# Patient Record
Sex: MALE | Race: BLACK OR AFRICAN AMERICAN | Employment: FULL TIME | URBAN - METROPOLITAN AREA
[De-identification: names, ages, dates, MRNs, and addresses within clinical notes are randomized per-mention and may not be internally consistent; named-entity substitution may affect disease eponyms.]

---

## 2017-10-26 ENCOUNTER — HOSPITAL ENCOUNTER (EMERGENCY)
Age: 42
Discharge: HOME | End: 2017-10-26
Attending: EMERGENCY MEDICINE
Payer: COMMERCIAL

## 2017-10-26 VITALS
HEART RATE: 90 BPM | WEIGHT: 150 LBS | BODY MASS INDEX: 24.11 KG/M2 | HEIGHT: 66 IN | OXYGEN SATURATION: 100 % | SYSTOLIC BLOOD PRESSURE: 132 MMHG | DIASTOLIC BLOOD PRESSURE: 90 MMHG | TEMPERATURE: 98.1 F | RESPIRATION RATE: 16 BRPM

## 2017-10-26 DIAGNOSIS — Z11.3 SCREENING FOR STD (SEXUALLY TRANSMITTED DISEASE): Primary | ICD-10-CM

## 2017-10-26 LAB
APPEARANCE UR: CLEAR
BACTERIA URNS QL MICRO: NEGATIVE /HPF
BILIRUB UR QL: NEGATIVE
COLOR UR: YELLOW
EPITH CASTS URNS QL MICRO: NEGATIVE /LPF (ref 0–5)
GLUCOSE UR STRIP.AUTO-MCNC: NEGATIVE MG/DL
HGB UR QL STRIP: NEGATIVE
KETONES UR QL STRIP.AUTO: NEGATIVE MG/DL
LEUKOCYTE ESTERASE UR QL STRIP.AUTO: ABNORMAL
NITRITE UR QL STRIP.AUTO: NEGATIVE
PH UR STRIP: 5 [PH] (ref 5–8)
PROT UR STRIP-MCNC: NEGATIVE MG/DL
RBC #/AREA URNS HPF: NORMAL /HPF (ref 0–5)
SP GR UR REFRACTOMETRY: 1.02 (ref 1–1.03)
UROBILINOGEN UR QL STRIP.AUTO: 0.2 EU/DL (ref 0.2–1)
WBC URNS QL MICRO: NORMAL /HPF (ref 0–5)

## 2017-10-26 PROCEDURE — 74011000250 HC RX REV CODE- 250: Performed by: PHYSICIAN ASSISTANT

## 2017-10-26 PROCEDURE — 74011250637 HC RX REV CODE- 250/637: Performed by: PHYSICIAN ASSISTANT

## 2017-10-26 PROCEDURE — 99284 EMERGENCY DEPT VISIT MOD MDM: CPT

## 2017-10-26 PROCEDURE — 87491 CHLMYD TRACH DNA AMP PROBE: CPT

## 2017-10-26 PROCEDURE — 74011250636 HC RX REV CODE- 250/636: Performed by: PHYSICIAN ASSISTANT

## 2017-10-26 PROCEDURE — 96372 THER/PROPH/DIAG INJ SC/IM: CPT

## 2017-10-26 PROCEDURE — 81001 URINALYSIS AUTO W/SCOPE: CPT

## 2017-10-26 RX ORDER — AZITHROMYCIN 250 MG/1
1000 TABLET, FILM COATED ORAL
Status: COMPLETED | OUTPATIENT
Start: 2017-10-26 | End: 2017-10-26

## 2017-10-26 RX ADMIN — LIDOCAINE HYDROCHLORIDE 500 MG: 10 INJECTION, SOLUTION INFILTRATION; PERINEURAL at 12:46

## 2017-10-26 RX ADMIN — AZITHROMYCIN 1000 MG: 250 TABLET, FILM COATED ORAL at 12:46

## 2017-10-26 NOTE — ED NOTES
Pt reports he had unprotected sex a few days ago - now worried that he may have contracted an STD. Denies any symptoms.

## 2017-10-26 NOTE — ED PROVIDER NOTES
[address]  EMERGENCY DEPARTMENT HISTORY AND PHYSICAL EXAM       Date: 10/26/2017   Patient Name: Anat Louise   YOB: 1975  Medical Record Number: [id number]    History of Presenting Illness     Chief Complaint   Patient presents with    Other      History Provided By:  patient    Additional History: 12:20 PM  Anat Louise is a 40 y.o. male who presents to the emergency department requesting STD check after having sexual intercourse 2 days ago while intoxicated. No known STD history from sexual partner, though suspected. No associated symptoms. Pt denies penile discharge, penile pain, difficulty urinating, dysuria, abdominal pain, and any other Sx or complaints. Pt states he would like to be treated for \"the usual suspects\" regardless of lack of symptoms at this time. Primary Care Provider: None   Specialist:    Past History     Past Medical History:   Past Medical History:   Diagnosis Date    Acid reflux     Anxiety         Past Surgical History:   No past surgical history on file. Family History:   No family history on file. Social History:   Social History   Substance Use Topics    Smoking status: Not on file    Smokeless tobacco: Not on file    Alcohol use Not on file        Allergies: Allergies   Allergen Reactions    Penicillins Unknown (comments)        Review of Systems   Review of Systems   Gastrointestinal: Negative for abdominal pain. Genitourinary: Negative for difficulty urinating, discharge, dysuria and penile pain. All other systems reviewed and are negative. Physical Exam  Vitals:    10/26/17 1209 10/26/17 1314   BP: (!) 132/97 132/90   Pulse: 98 90   Resp: 16 16   Temp: 98.1 °F (36.7 °C)    SpO2: 100% 100%   Weight: 68 kg (150 lb)    Height: 5' 6\" (1.676 m)        Physical Exam   Constitutional: He is oriented to person, place, and time. Vital signs are normal. He appears well-developed and well-nourished.  No distress. HENT:   Head: Normocephalic and atraumatic. Eyes: Conjunctivae are normal.   Neck: Normal range of motion. Neck supple. Cardiovascular: Normal rate, regular rhythm and normal heart sounds. Pulmonary/Chest: Effort normal and breath sounds normal. No respiratory distress. He has no wheezes. He has no rales. He exhibits no tenderness. Abdominal: Soft. Bowel sounds are normal. He exhibits no distension and no mass. There is no tenderness. There is no rebound and no guarding. Genitourinary:   Genitourinary Comments: Pt declined genital exam   Musculoskeletal: Normal range of motion. Neurological: He is alert and oriented to person, place, and time. Skin: Skin is warm and dry. No rash noted. He is not diaphoretic. No erythema. Psychiatric: He has a normal mood and affect. His behavior is normal.   Nursing note and vitals reviewed. Diagnostic Study Results     Labs -      No results found for this or any previous visit (from the past 12 hour(s)). Radiologic Studies -  The following have been ordered and reviewed:  No orders to display           Medical Decision Making   I am the first provider for this patient. I reviewed the vital signs, available nursing notes, past medical history, past surgical history, family history and social history. Vital Signs-Reviewed the patient's vital signs. No data found. Pulse Oximetry Analysis - Normal 100% on RA     Old Medical Records: Nursing notes. Procedures:   Procedures    ED Course:  12:20 PM  Initial assessment performed. The patients presenting problems have been discussed, and they are in agreement with the care plan formulated and outlined with them. I have encouraged them to ask questions as they arise throughout their visit.     Medications Given in the ED:  Medications   cefTRIAXone (ROCEPHIN) 500 mg in lidocaine (XYLOCAINE) 10 mg/mL (1 %) IM syringe (500 mg IntraMUSCular Given 10/26/17 3076)   azithromycin [facility]) tablet 1,000 mg (1,000 mg Oral Given 10/26/17 1246)       Discharge Note:  12:53 PM  Pt has been reexamined. Patient has no new complaints, changes, or physical findings. Care plan outlined and precautions discussed. Results were reviewed with the patient. All of pt's questions and concerns were addressed. Patient was instructed and agrees to follow up with PCP, as well as to return to the ED upon further deterioration. Patient is ready to go home. Diagnosis   Clinical Impression:   1. Screening for STD (sexually transmitted disease)           Follow-up Information     Follow up With Details Comments Leeanne Turner MD Schedule an appointment as soon as possible for a visit For primary care follow up [id number] [address]  [phone]      [facility] EMERGENCY DEPT Go to As needed, as symptoms worsen [address] [address]  [phone]          Discharge Medication List as of 10/26/2017 12:53 PM          _______________________________   Attestations: This note is prepared by Get Ramesh, acting as a Scribe for [employer], PA-C. on 12:13 PM on 10/26/2017 . [employer], PA-C.: The scribe's documentation has been prepared under my direction and personally reviewed by me in its entirety.   _______________________________

## 2017-10-26 NOTE — ED TRIAGE NOTES
C/o drinking alcohol the other night and having sex with someone else. States he wants to be treated for STD's, that he made a bad decision. Denies sx's.

## 2017-10-26 NOTE — DISCHARGE INSTRUCTIONS
Exposure to Sexually Transmitted Infections: Care Instructions  Your Care Instructions    Sexually transmitted infections (STIs) are those diseases spread by sexual contact. There are at least 20 different STIs, including chlamydia, gonorrhea, syphilis, and human immunodeficiency virus (HIV), which causes AIDS. Bacteria-caused STIs can be treated and cured. STIs caused by viruses, such as HIV, can be treated but not cured. Some STIs can reduce a woman's chances of getting pregnant in the future. STIs are spread during sexual contact, such as vaginal intercourse and oral or anal sex. Follow-up care is a key part of your treatment and safety. Be sure to make and go to all appointments, and call your doctor if you are having problems. It's also a good idea to know your test results and keep a list of the medicines you take. How can you care for yourself at home? · Your doctor may have given you a shot of antibiotics. If your doctor prescribed antibiotic pills, take them as directed. Do not stop taking them just because you feel better. You need to take the full course of antibiotics. · Do not have sexual contact while you have symptoms of an STI or are being treated for an STI. · Tell your sex partner (or partners) that he or she will need treatment. · If you are a woman, do not douche. Douching changes the normal balance of bacteria in the vagina and may spread an infection up into your reproductive organs. To prevent exposure to STIs in the future  · Use latex condoms every time you have sex. Use them from the beginning to the end of sexual contact. · Talk to your partner before you have sex. Find out if he or she has or is at risk for any STI. Keep in mind that a person may be able to spread an STI even if he or she does not have symptoms. · Do not have sex if you are being treated for an STI. · Do not have sex with anyone who has symptoms of an STI, such as sores on the genitals or mouth.   · Having one sex partner (who does not have STIs and does not have sex with anyone else) is a good way to avoid STIs. When should you call for help? Call your doctor now or seek immediate medical care if:  ? · You have new pain in your belly or pelvis. ? · You have symptoms of a urinary tract infection. These may include:  ¨ Pain or burning when you urinate. ¨ A frequent need to urinate without being able to pass much urine. ¨ Pain in the flank, which is just below the rib cage and above the waist on either side of the back. ¨ Blood in your urine. ¨ A fever. ? · You have new or worsening pain or swelling in the scrotum. ? Watch closely for changes in your health, and be sure to contact your doctor if:  ? · You have unusual vaginal bleeding. ? · You have a discharge from the vagina or penis. ? · You have any new symptoms, such as sores, bumps, rashes, blisters, or warts. ? · You have itching, tingling, pain, or burning in the genital or anal area. ? · You think you may have an STI. Where can you learn more? Go to http://gemma-natalya.info/. Enter R210 in the search box to learn more about \"Exposure to Sexually Transmitted Infections: Care Instructions. \"  Current as of: March 20, 2017  Content Version: 11.4  © 6157-7971 [employer]. Care instructions adapted under license by [employer] (which disclaims liability or warranty for this information). If you have questions about a medical condition or this instruction, always ask your healthcare professional. [place] [address] any warranty or liability for your use of this information. Learning How to Use a Male Condom  What is a male condom? Condoms can be used to prevent pregnancy. They can also help protect against sexually transmitted infections (STIs). You must use a new condom every time you have sex. Condoms prevent pregnancy by keeping sperm and eggs apart.  The condom holds the sperm so the sperm can't get into the vagina. A male condom is a tube of soft rubber or plastic with a closed end. It fits over the penis. There are many kinds of male condoms. Some condoms are lubricated. Some are ribbed. Most have a \"reservoir tip\" for holding the semen. You can also buy condoms of different sizes. How do you use a condom? Condoms work best if you follow these steps. · Use a new condom each time you have sex. · Check the condom's expiration date. Do not use it past that date. · When opening the condom wrapper, be sure not to poke a hole in the condom with your fingernails, teeth, or other sharp objects. · Put the condom on as soon as the penis is hard (erect) and before any sexual contact with your partner. ¨ First, hold the tip of the condom and squeeze out the air. This leaves room for the semen after you ejaculate. ¨ If you are not circumcised, pull down the loose skin from the head of the penis (foreskin) before you put on the condom. ¨ Hold on to the tip of the condom as you unroll the condom. Unroll it all the way down to the base of the penis. · After you ejaculate, hold on to the condom at the base of the penis, and withdraw from your partner while your penis is still erect. This will keep semen from spilling out of the condom. · Wash your hands after you handle a used condom. How do you buy and store condoms? · Male condoms may be available for free at family planning clinics. You can buy them without a prescription at drugstores, online, and in some grocery stores. · Keep condoms wrapped in their original packages until you are ready to use them. Store them in a cool, dry place out of direct sunlight. · Don't keep rubber (latex) condoms in a glove compartment or other hot places for a long time. Heat weakens latex and increases the chance that the condom will break. · Don't use condoms in damaged packages.  And don't use condoms that are brittle, sticky, or discolored, even if they are not past their expiration date. What else do you need to know? · To protect yourself and your partner from STIs, use a condom during vaginal, oral, or anal sex. · If the condom breaks or you think sperm may have leaked out into the vagina, the woman can use emergency contraception, such as the morning-after pill (Plan B). Emergency contraception can be used for up to 5 days after you have had sex. But it works best if you use it right away. · Use a male condom with another form of birth control. It's the best way to prevent pregnancy. ¨ You can use the condom with hormonal contraception, an intrauterine device (IUD), a diaphragm, a sponge, a shield, or a cervical cap. ¨ Don't use a male condom with a female condom. ¨ Use spermicide as its instructions say. Don't put spermicide inside a condom. · If you or your partner gets a rash or feels itchy after using a latex condom, talk to your doctor. You may have a latex allergy. Where can you learn more? Go to http://gemma-natalya.info/. Enter Y557 in the search box to learn more about \"Learning How to Use a Male Condom. \"  Current as of: March 16, 2017  Content Version: 11.4  © 3341-9835 [employer]. Care instructions adapted under license by [employer] (which disclaims liability or warranty for this information). If you have questions about a medical condition or this instruction, always ask your healthcare professional. [place] [address] any warranty or liability for your use of this information.

## 2017-10-27 LAB
C TRACH RRNA SPEC QL NAA+PROBE: NEGATIVE
N GONORRHOEA RRNA SPEC QL NAA+PROBE: NEGATIVE
SPECIMEN SOURCE: NORMAL

## 2019-07-19 ENCOUNTER — HOSPITAL ENCOUNTER (EMERGENCY)
Age: 44
Discharge: HOME | End: 2019-07-19
Attending: EMERGENCY MEDICINE
Payer: COMMERCIAL

## 2019-07-19 VITALS
WEIGHT: 145 LBS | SYSTOLIC BLOOD PRESSURE: 137 MMHG | BODY MASS INDEX: 23.3 KG/M2 | HEART RATE: 83 BPM | HEIGHT: 66 IN | OXYGEN SATURATION: 100 % | RESPIRATION RATE: 16 BRPM | TEMPERATURE: 96.7 F | DIASTOLIC BLOOD PRESSURE: 90 MMHG

## 2019-07-19 DIAGNOSIS — R25.2 MUSCLE CRAMPS: Primary | ICD-10-CM

## 2019-07-19 LAB
ANION GAP SERPL CALC-SCNC: 5 MMOL/L (ref 3–18)
BUN SERPL-MCNC: 18 MG/DL (ref 7–18)
BUN/CREAT SERPL: 15 (ref 12–20)
CALCIUM SERPL-MCNC: 8.5 MG/DL (ref 8.5–10.1)
CHLORIDE SERPL-SCNC: 101 MMOL/L (ref 100–111)
CK SERPL-CCNC: 301 U/L (ref 39–308)
CO2 SERPL-SCNC: 29 MMOL/L (ref 21–32)
CREAT SERPL-MCNC: 1.18 MG/DL (ref 0.6–1.3)
GLUCOSE SERPL-MCNC: 73 MG/DL (ref 74–99)
MAGNESIUM SERPL-MCNC: 1.9 MG/DL (ref 1.6–2.6)
POTASSIUM SERPL-SCNC: 3.6 MMOL/L (ref 3.5–5.5)
SODIUM SERPL-SCNC: 135 MMOL/L (ref 136–145)

## 2019-07-19 PROCEDURE — 83735 ASSAY OF MAGNESIUM: CPT

## 2019-07-19 PROCEDURE — 99282 EMERGENCY DEPT VISIT SF MDM: CPT

## 2019-07-19 PROCEDURE — 74011250636 HC RX REV CODE- 250/636: Performed by: EMERGENCY MEDICINE

## 2019-07-19 PROCEDURE — 96360 HYDRATION IV INFUSION INIT: CPT

## 2019-07-19 PROCEDURE — 80048 BASIC METABOLIC PNL TOTAL CA: CPT

## 2019-07-19 PROCEDURE — 82550 ASSAY OF CK (CPK): CPT

## 2019-07-19 RX ADMIN — SODIUM CHLORIDE 1000 ML: 900 INJECTION, SOLUTION INTRAVENOUS at 11:37

## 2019-07-19 NOTE — ED TRIAGE NOTES
Patient states right hand \"cramping\" that started about 45mins ago. Patient concerned that he has \"heat stroke\" from working outside. Patient states he is also having an \"anxiety attack\" and took \"my pill\".

## 2019-07-19 NOTE — DISCHARGE INSTRUCTIONS
Patient Education        Muscle Cramps: Care Instructions  Your Care Instructions    A muscle cramp occurs when a muscle tightens up suddenly. A cramp often happens in the legs. A muscle cramp is also called a muscle spasm or a charley horse. Muscle cramps usually last less than a minute. However, the pain may last for several minutes. Leg cramps that occur at night may wake you up. Heavy exercise, dehydration, and being overweight can increase your risk of getting cramps. An imbalance of certain chemicals in your blood, called electrolytes, can also lead to muscle cramps. Pregnant women sometimes get muscle cramps during sleep. Muscle cramps can be treated by stretching and massaging the muscle. If cramps keep coming back, your doctor may prescribe medicine that relaxes your muscles. Follow-up care is a key part of your treatment and safety. Be sure to make and go to all appointments, and call your doctor if you are having problems. It's also a good idea to know your test results and keep a list of the medicines you take. How can you care for yourself at home? · Drink plenty of fluids to prevent dehydration. Choose water and other caffeine-free clear liquids until you feel better. If you have kidney, heart, or liver disease and have to limit fluids, talk with your doctor before you increase the amount of fluids you drink. · Stretch your muscles every day, especially before and after exercise and at bedtime. Regular stretching can relax your muscles and may prevent cramps. · Do not suddenly increase the amount of exercise you get. Increase your exercise a little each week. · When you get a cramp, stretch and massage the muscle. You can also take a warm shower or bath to relax the muscle. A heating pad placed on the muscle can also help. · Take a daily multivitamin supplement.   · Ask your doctor if you can take an over-the-counter pain medicine, such as acetaminophen (Tylenol), ibuprofen (Advil, Motrin), or naproxen (Aleve). Be safe with medicines. Read and follow all instructions on the label. When should you call for help? Watch closely for changes in your health, and be sure to contact your doctor if:    · You get muscle cramps often that do not go away after home treatment.     · Your muscle cramps often wake you up at night.     · You do not get better as expected. Where can you learn more? Go to http://gemma-natalya.info/. Enter M005 in the search box to learn more about \"Muscle Cramps: Care Instructions. \"  Current as of: September 20, 2018  Content Version: 11.9  © 2589-7094 [employer]. Care instructions adapted under license by [employer] (which disclaims liability or warranty for this information). If you have questions about a medical condition or this instruction, always ask your healthcare professional. [address] any warranty or liability for your use of this information.

## 2019-07-19 NOTE — ED PROVIDER NOTES
EMERGENCY DEPARTMENT HISTORY AND PHYSICAL EXAM    11:12 AM      Date: 7/19/2019  Patient Name: Liz Best    History of Presenting Illness     Chief Complaint   Patient presents with    Other    Anxiety         History Provided By: Patient    Chief Complaint: hand cramps  Duration:  Minutes  Timing:  Acute  Location: R hand  Quality: Cramping  Severity: Moderate  Modifying Factors: none  Associated Symptoms: chest tightness and anxiety      Additional History (Context): Liz Best is a 44 y.o. male with anxiety who presents with right hand cramps. Patient states he was working outside this morning and trying to avoid the heat. He states about 1 hour ago while in the car he started having cramps in his right hand. He states he then was feeling a little chest tightness and some anxiety which she has had in the past.  Denies ever prior episodes of hand cramping with his anxiety attacks. He did take his BuSpar due to this. Currently denies any persistent chest pain or tightness. Denies any shortness of breath. Denies any diaphoresis. No other complaints. PCP: Other, MD Frantz    Current Facility-Administered Medications   Medication Dose Route Frequency Provider Last Rate Last Dose    sodium chloride 0.9 % bolus infusion 1,000 mL  1,000 mL IntraVENous ONCE Courtney BOYCE, DO 1,000 mL/hr at 07/19/19 1137 1,000 mL at 07/19/19 1137     Current Outpatient Medications   Medication Sig Dispense Refill    BUSPIRONE HCL (BUSPAR PO) Take  by mouth.  OMEPRAZOLE, BULK, by Does Not Apply route. Past History     Past Medical History:  Past Medical History:   Diagnosis Date    Acid reflux     Anxiety        Past Surgical History:  History reviewed. No pertinent surgical history. Family History:  History reviewed. No pertinent family history.     Social History:  Social History     Tobacco Use    Smoking status: Current Every Day Smoker     Packs/day: 0.25    Smokeless tobacco: Never Used   Substance Use Topics    Alcohol use: Yes     Alcohol/week: 14.0 standard drinks     Types: 14 Cans of beer per week    Drug use: Not Currently       Allergies: Allergies   Allergen Reactions    Penicillins Unknown (comments)         Review of Systems       Review of Systems   Constitutional: Negative for fever. Respiratory: Positive for chest tightness. Musculoskeletal: Positive for myalgias. Neurological: Negative for weakness and numbness. Psychiatric/Behavioral: The patient is nervous/anxious. All other systems reviewed and are negative. Physical Exam     Visit Vitals  /90 (BP 1 Location: Right arm, BP Patient Position: At rest)   Pulse 83   Temp 96.7 °F (35.9 °C)   Resp 16   Ht 5' 6\" (1.676 m)   Wt 65.8 kg (145 lb)   SpO2 100%   BMI 23.40 kg/m²       Physical Exam   Constitutional: He is oriented to person, place, and time. He appears well-developed and well-nourished. HENT:   Head: Normocephalic and atraumatic. Neck: Neck supple. No JVD present. Cardiovascular: Normal rate and regular rhythm. Pulmonary/Chest: Effort normal and breath sounds normal. No respiratory distress. Abdominal: Soft. He exhibits no distension. There is no tenderness. There is no rebound and no guarding. Musculoskeletal: He exhibits no edema. No joint tenderness   Neurological: He is alert and oriented to person, place, and time. Strength 5 out of 5 x 4, gross sensation intact x4, normal gait   Skin: Skin is warm and dry. No erythema.    Psychiatric: Judgment normal.         Diagnostic Study Results     Labs -  Recent Results (from the past 12 hour(s))   METABOLIC PANEL, BASIC    Collection Time: 07/19/19 11:34 AM   Result Value Ref Range    Sodium 135 (L) 136 - 145 mmol/L    Potassium 3.6 3.5 - 5.5 mmol/L    Chloride 101 100 - 111 mmol/L    CO2 29 21 - 32 mmol/L    Anion gap 5 3.0 - 18 mmol/L    Glucose 73 (L) 74 - 99 mg/dL    BUN 18 7.0 - 18 MG/DL    Creatinine 1.18 0.6 - 1.3 MG/DL BUN/Creatinine ratio 15 12 - 20      GFR est AA >60 >60 ml/min/1.73m2    GFR est non-AA >60 >60 ml/min/1.73m2    Calcium 8.5 8.5 - 10.1 MG/DL   CK    Collection Time: 07/19/19 11:34 AM   Result Value Ref Range     39 - 308 U/L   MAGNESIUM    Collection Time: 07/19/19 11:34 AM   Result Value Ref Range    Magnesium 1.9 1.6 - 2.6 mg/dL       Radiologic Studies -   No orders to display         Medical Decision Making   I am the first provider for this patient. I reviewed the vital signs, available nursing notes, past medical history, past surgical history, family history and social history. Vital Signs-Reviewed the patient's vital signs. Pulse Oximetry Analysis -  100 on room air (Interpretation)nl       Records Reviewed: Nursing Notes (Time of Review: 11:12 AM)    ED Course: Progress Notes, Reevaluation, and Consults:    Provider Notes (Medical Decision Making): 52-year-old male presenting with right hand cramping intermittently. Also had some chest tightness but is now resolved. Unclear if this could be due to anxiety versus electrolyte disorder versus rhabdo so we will check basic labs and give a fluid bolus. No focal deficits at this time, not consistent with CVA. 12:25 PM  Discussed results with patient, he is feeling better. Stable for DC home. Advised PCP follow-up. Discussed return precautions. Patient agreement with plan. Diagnosis     Clinical Impression:   1. Muscle cramps        Disposition: discharged    Follow-up Information     Follow up With Specialties Details Why Contact Info    your PCP  Schedule an appointment as soon as possible for a visit in 1 week      [facility] EMERGENCY DEPT Emergency Medicine  As needed, If symptoms worsen [address]  [phone]           Patient's Medications   Start Taking    No medications on file   Continue Taking    BUSPIRONE HCL (BUSPAR PO)    Take  by mouth. OMEPRAZOLE, BULK,    by Does Not Apply route.    These Medications have changed    No medications on file   Stop Taking    No medications on file     _______________________________